# Patient Record
Sex: MALE | Race: WHITE | NOT HISPANIC OR LATINO | Employment: UNEMPLOYED | URBAN - NONMETROPOLITAN AREA
[De-identification: names, ages, dates, MRNs, and addresses within clinical notes are randomized per-mention and may not be internally consistent; named-entity substitution may affect disease eponyms.]

---
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
## 2025-02-26 ENCOUNTER — HOSPITAL ENCOUNTER (EMERGENCY)
Facility: HOSPITAL | Age: 20
Discharge: HOME | End: 2025-02-26
Attending: PHYSICIAN ASSISTANT
Payer: COMMERCIAL

## 2025-02-26 VITALS
TEMPERATURE: 100.4 F | WEIGHT: 210 LBS | DIASTOLIC BLOOD PRESSURE: 77 MMHG | HEART RATE: 87 BPM | RESPIRATION RATE: 18 BRPM | BODY MASS INDEX: 24.79 KG/M2 | OXYGEN SATURATION: 97 % | HEIGHT: 77 IN | SYSTOLIC BLOOD PRESSURE: 153 MMHG

## 2025-02-26 DIAGNOSIS — B34.9 VIRAL SYNDROME: ICD-10-CM

## 2025-02-26 LAB
FLUAV RNA SPEC QL NAA+PROBE: POSITIVE
FLUBV RNA RESP QL NAA+PROBE: NEGATIVE
RSV RNA SPEC NAA+PROBE: NEGATIVE
SARS-COV-2 RNA RESP QL NAA+PROBE: NEGATIVE

## 2025-02-26 PROCEDURE — 87637 SARSCOV2&INF A&B&RSV AMP PRB: CPT | Performed by: PHYSICIAN ASSISTANT

## 2025-02-26 PROCEDURE — G0463 HOSPITAL OUTPT CLINIC VISIT: HCPCS

## 2025-02-26 PROCEDURE — 99213 OFFICE O/P EST LOW 20 MIN: CPT | Performed by: PHYSICIAN ASSISTANT

## 2025-02-26 RX ORDER — PROPRANOLOL HCL 20 MG
TABLET ORAL
COMMUNITY
Start: 2025-01-30

## 2025-02-26 RX ORDER — FLUTICASONE FUROATE 100 UG/1
POWDER RESPIRATORY (INHALATION)
COMMUNITY
Start: 2024-12-09

## 2025-02-26 RX ORDER — SERTRALINE HYDROCHLORIDE 100 MG/1
TABLET, FILM COATED ORAL
COMMUNITY
Start: 2025-01-30

## 2025-02-26 RX ORDER — CLINDAMYCIN PHOSPHATE 10 MG/G
GEL TOPICAL
COMMUNITY
Start: 2024-07-18

## 2025-02-26 ASSESSMENT — ENCOUNTER SYMPTOMS
FEVER: 1
SORE THROAT: 1
COUGH: 1
FATIGUE: 1
VOMITING: 1
MYALGIAS: 1
SHORTNESS OF BREATH: 0

## 2025-02-26 ASSESSMENT — ACTIVITIES OF DAILY LIVING (ADL): ADLS_ACUITY_SCORE: 41

## 2025-02-26 NOTE — ED TRIAGE NOTES
Pt presents with cough, congestion and vomiting x 2 days. Throat pain. Fever unknown. PT denied ear pain and diarrhea. PT has been taking tylenol and ibuprofen.

## 2025-02-26 NOTE — Clinical Note
Guevara Erickson was seen and treated in our emergency department on 2/26/2025.  He may return to work on 03/04/2025.  Please excuse patient from work due to medical illness     If you have any questions or concerns, please don't hesitate to call.      Abdias Holloway PA-C

## 2025-02-26 NOTE — ED PROVIDER NOTES
"  History     Chief Complaint   Patient presents with    Cough    Nausea, Vomiting, & Diarrhea     HPI  Guevara Erickson is a 19 year old male who presents to urgent care for evaluation of flulike symptoms.  Over the past 2 days patient has experienced cough, congestion, vomiting, fatigue, fever, sore throat, myalgias.  Patient denies any otalgia, diarrhea, shortness of breath, or any other associated symptoms.    Allergies:  Allergies   Allergen Reactions    Peanuts [Nuts] Anaphylaxis    Cephalexin Rash     nausea and vomiting and facial rash - seen at [facility] in [place]  - see note from 3/15/15       Problem List:    Patient Active Problem List    Diagnosis Date Noted    Mild intermittent asthma with exacerbation 11/10/2023     Priority: Medium        Past Medical History:    No past medical history on file.    Past Surgical History:    No past surgical history on file.    Family History:    No family history on file.    Social History:  Marital Status:  Single [1]        Medications:    albuterol (VENTOLIN HFA) 108 (90 Base) MCG/ACT inhaler  ARNUITY ELLIPTA 100 MCG/ACT inhaler  clindamycin (CLEOCIN-T) 1 % external gel  propranolol (INDERAL) 20 MG tablet  sertraline (ZOLOFT) 100 MG tablet  sertraline (ZOLOFT) 50 MG tablet  azithromycin (ZITHROMAX Z-JANETH) 250 MG tablet  predniSONE (DELTASONE) 20 MG tablet          Review of Systems   Constitutional:  Positive for fatigue and fever.   HENT:  Positive for congestion and sore throat.    Respiratory:  Positive for cough. Negative for shortness of breath.    Gastrointestinal:  Positive for vomiting.   Musculoskeletal:  Positive for myalgias.   All other systems reviewed and are negative.      Physical Exam   BP: (!) 153/77  Pulse: 87  Temp: 100.4  F (38  C)  Resp: 18  Height: 195.6 cm (6' 5\")  Weight: 95.3 kg (210 lb)  SpO2: 97 %      Physical Exam  Vitals and nursing note reviewed.   Constitutional:       General: He is not in acute distress.     " Appearance: Normal appearance. He is not ill-appearing or toxic-appearing.   HENT:      Right Ear: Tympanic membrane normal.      Left Ear: Tympanic membrane normal.      Nose: Nose normal.      Mouth/Throat:      Mouth: Mucous membranes are moist.      Pharynx: Oropharynx is clear.   Eyes:      Conjunctiva/sclera: Conjunctivae normal.      Pupils: Pupils are equal, round, and reactive to light.   Cardiovascular:      Rate and Rhythm: Regular rhythm.      Heart sounds: Normal heart sounds.   Pulmonary:      Effort: Pulmonary effort is normal.      Breath sounds: Normal breath sounds.   Neurological:      Mental Status: He is alert and oriented to person, place, and time.         ED Course        Procedures             Critical Care time:               No results found for this or any previous visit (from the past 24 hours).    Medications - No data to display    Assessments & Plan (with Medical Decision Making)   #1.  Viral syndrome    Discussed exam findings with patient.  Patient has multiplex swab pending and will be notified of results.  Patient has MyChart to view results as well.  Supportive cares discussed at length with patient.  Push fluids and rest.  Tylenol or ibuprofen as directed for fever.  Any shortness of breath patient should go to emergency department immediately.  Any additional concerns patient can return to urgent care or follow-up with primary care provider.  Patient verbalized understanding and agreement of plan.      I have reviewed the nursing notes.    I have reviewed the findings, diagnosis, plan and need for follow up with the patient.                Discharge Medication List as of 2/26/2025  9:29 AM          Final diagnoses:   Viral syndrome       2/26/2025   [facility]       Abdias Holloway PA-C  02/26/25 [id number]